# Patient Record
Sex: FEMALE | URBAN - METROPOLITAN AREA
[De-identification: names, ages, dates, MRNs, and addresses within clinical notes are randomized per-mention and may not be internally consistent; named-entity substitution may affect disease eponyms.]

---

## 2019-01-11 ENCOUNTER — APPOINTMENT (RX ONLY)
Dept: URBAN - METROPOLITAN AREA CLINIC 56 | Facility: CLINIC | Age: 19
Setting detail: DERMATOLOGY
End: 2019-01-11

## 2019-01-11 DIAGNOSIS — L83 ACANTHOSIS NIGRICANS: ICD-10-CM

## 2019-01-11 PROBLEM — L70.0 ACNE VULGARIS: Status: ACTIVE | Noted: 2019-01-11

## 2019-01-11 PROBLEM — L30.9 DERMATITIS, UNSPECIFIED: Status: ACTIVE | Noted: 2019-01-11

## 2019-01-11 PROCEDURE — 11105 PUNCH BX SKIN EA SEP/ADDL: CPT

## 2019-01-11 PROCEDURE — ? BIOPSY BY PUNCH METHOD

## 2019-01-11 PROCEDURE — 99202 OFFICE O/P NEW SF 15 MIN: CPT | Mod: 25

## 2019-01-11 PROCEDURE — 11104 PUNCH BX SKIN SINGLE LESION: CPT

## 2019-01-11 PROCEDURE — ? COUNSELING

## 2019-01-11 PROCEDURE — ? ORDER TESTS

## 2019-01-11 ASSESSMENT — LOCATION SIMPLE DESCRIPTION DERM
LOCATION SIMPLE: LEFT BREAST
LOCATION SIMPLE: RIGHT UPPER BACK
LOCATION SIMPLE: UPPER BACK
LOCATION SIMPLE: RIGHT SHOULDER
LOCATION SIMPLE: RIGHT ANTERIOR NECK
LOCATION SIMPLE: CHEST

## 2019-01-11 ASSESSMENT — LOCATION DETAILED DESCRIPTION DERM
LOCATION DETAILED: RIGHT INFERIOR ANTERIOR NECK
LOCATION DETAILED: RIGHT ANTERIOR SHOULDER
LOCATION DETAILED: RIGHT SUPERIOR MEDIAL UPPER BACK
LOCATION DETAILED: LEFT AXILLARY TAIL OF BREAST
LOCATION DETAILED: SUPERIOR THORACIC SPINE
LOCATION DETAILED: UPPER STERNUM

## 2019-01-11 ASSESSMENT — LOCATION ZONE DERM
LOCATION ZONE: ARM
LOCATION ZONE: TRUNK
LOCATION ZONE: NECK

## 2019-01-11 NOTE — PROCEDURE: BIOPSY BY PUNCH METHOD
Size Of Lesion In Cm (Optional): 0
Path Notes (To The Dermatopathologist): R/o EAS vs. EMM vs. other. Dr. CHAVEZ to read.
Suture Removal: 7 days
Detail Level: Detailed
Punch Size In Mm: 4
Post-Care Instructions: I reviewed with the patient in detail post-care instructions. Patient is to keep the biopsy site dry overnight, and then apply bacitracin twice daily until healed. Patient may apply hydrogen peroxide soaks to remove any crusting.
Anesthesia Volume In Cc (Will Not Render If 0): 0.5
Wound Care: Petrolatum
Bill For Surgical Tray: no
Epidermal Sutures: 4-0 Ethilon
Home Suture Removal Text: Patient was provided a home suture removal kit and will remove their sutures at home.  If they have any questions or difficulties they will call the office.
Lab: 6
Biopsy Type: H and E
Hemostasis: None
Notification Instructions: Patient will be notified of biopsy results. However, patient instructed to call the office if not contacted within 2 weeks.
Was A Bandage Applied: Yes
Consent: Written consent was obtained and risks were reviewed including but not limited to scarring, infection, bleeding, scabbing, incomplete removal, nerve damage and allergy to anesthesia.
Dressing: bandage
Lab Facility: [id number]
Anesthesia Type: 1% lidocaine with epinephrine
Billing Type: Third-Party Bill
Path Notes (To The Dermatopathologist): R/o EAS vs. EMM vs. other. Dr. CHAVEZ to read.

## 2019-01-11 NOTE — PROCEDURE: ORDER TESTS
Performing Laboratory: -40
Billing Type: Third-Party Bill
Expected Date Of Service: 01/11/2019
Bill For Surgical Tray: no

## 2019-01-11 NOTE — PROCEDURE: COUNSELING
Patient Specific Counseling (Will Not Stick From Patient To Patient): Patient was advised to use Am-Lactin lotion every day to chest, neck and back.
Detail Level: Detailed

## 2019-01-25 ENCOUNTER — APPOINTMENT (RX ONLY)
Dept: URBAN - METROPOLITAN AREA CLINIC 56 | Facility: CLINIC | Age: 19
Setting detail: DERMATOLOGY
End: 2019-01-25

## 2019-01-25 DIAGNOSIS — Z48.02 ENCOUNTER FOR REMOVAL OF SUTURES: ICD-10-CM

## 2019-01-25 DIAGNOSIS — L83 ACANTHOSIS NIGRICANS: ICD-10-CM

## 2019-01-25 PROCEDURE — 99212 OFFICE O/P EST SF 10 MIN: CPT

## 2019-01-25 PROCEDURE — ? COUNSELING

## 2019-01-25 PROCEDURE — 99024 POSTOP FOLLOW-UP VISIT: CPT

## 2019-01-25 PROCEDURE — ? SUTURE REMOVAL (GLOBAL PERIOD)

## 2019-01-25 PROCEDURE — ? PRESCRIPTION

## 2019-01-25 RX ORDER — MINOCYCLINE HYDROCHLORIDE 100 MG/1
CAPSULE ORAL QD
Qty: 28 | Refills: 3 | Status: ERX | COMMUNITY
Start: 2019-01-25

## 2019-01-25 RX ADMIN — MINOCYCLINE HYDROCHLORIDE: 100 CAPSULE ORAL at 00:00

## 2019-01-25 ASSESSMENT — LOCATION DETAILED DESCRIPTION DERM
LOCATION DETAILED: SUPERIOR THORACIC SPINE
LOCATION DETAILED: RIGHT SUPERIOR MEDIAL UPPER BACK

## 2019-01-25 ASSESSMENT — LOCATION SIMPLE DESCRIPTION DERM
LOCATION SIMPLE: RIGHT UPPER BACK
LOCATION SIMPLE: UPPER BACK

## 2019-01-25 ASSESSMENT — LOCATION ZONE DERM: LOCATION ZONE: TRUNK

## 2019-01-25 NOTE — PROCEDURE: SUTURE REMOVAL (GLOBAL PERIOD)
Detail Level: Detailed
Add 84826 Cpt? (Important Note: In 2017 The Use Of 94260 Is Being Tracked By Cms To Determine Future Global Period Reimbursement For Global Periods): yes

## 2019-02-01 ENCOUNTER — APPOINTMENT (RX ONLY)
Dept: URBAN - METROPOLITAN AREA CLINIC 56 | Facility: CLINIC | Age: 19
Setting detail: DERMATOLOGY
End: 2019-02-01

## 2019-02-01 DIAGNOSIS — Z48.817 ENCOUNTER FOR SURGICAL AFTERCARE FOLLOWING SURGERY ON THE SKIN AND SUBCUTANEOUS TISSUE: ICD-10-CM

## 2019-02-01 PROCEDURE — ? POST-OP WOUND CHECK

## 2019-02-01 PROCEDURE — 99024 POSTOP FOLLOW-UP VISIT: CPT

## 2019-02-01 ASSESSMENT — LOCATION SIMPLE DESCRIPTION DERM: LOCATION SIMPLE: POSTERIOR NECK

## 2019-02-01 ASSESSMENT — LOCATION ZONE DERM: LOCATION ZONE: NECK

## 2019-02-01 ASSESSMENT — LOCATION DETAILED DESCRIPTION DERM: LOCATION DETAILED: RIGHT MEDIAL TRAPEZIAL NECK

## 2019-02-01 NOTE — PROCEDURE: POST-OP WOUND CHECK
Add 91685 Cpt? (Important Note: In 2017 The Use Of 40147 Is Being Tracked By Cms To Determine Future Global Period Reimbursement For Global Periods): yes
Wound Evaluated By: Rachel Washburn PA-C
Detail Level: Detailed

## 2019-02-22 ENCOUNTER — APPOINTMENT (RX ONLY)
Dept: URBAN - METROPOLITAN AREA CLINIC 56 | Facility: CLINIC | Age: 19
Setting detail: DERMATOLOGY
End: 2019-02-22

## 2019-02-22 DIAGNOSIS — L83 ACANTHOSIS NIGRICANS: ICD-10-CM

## 2019-02-22 DIAGNOSIS — Z48.817 ENCOUNTER FOR SURGICAL AFTERCARE FOLLOWING SURGERY ON THE SKIN AND SUBCUTANEOUS TISSUE: ICD-10-CM

## 2019-02-22 PROCEDURE — ? POST-OP WOUND CHECK

## 2019-02-22 PROCEDURE — ? COUNSELING

## 2019-02-22 PROCEDURE — 99212 OFFICE O/P EST SF 10 MIN: CPT

## 2019-02-22 PROCEDURE — 99024 POSTOP FOLLOW-UP VISIT: CPT

## 2019-02-22 PROCEDURE — ? INVENTORY

## 2019-02-22 ASSESSMENT — LOCATION SIMPLE DESCRIPTION DERM: LOCATION SIMPLE: RIGHT UPPER BACK

## 2019-02-22 ASSESSMENT — LOCATION ZONE DERM: LOCATION ZONE: TRUNK

## 2019-02-22 ASSESSMENT — LOCATION DETAILED DESCRIPTION DERM: LOCATION DETAILED: RIGHT SUPERIOR MEDIAL UPPER BACK

## 2019-02-22 NOTE — PROCEDURE: COUNSELING
Patient Specific Counseling (Will Not Stick From Patient To Patient): Patient will decrease minocycline to QOD.
Detail Level: Detailed

## 2019-02-22 NOTE — PROCEDURE: POST-OP WOUND CHECK
Detail Level: Detailed
Wound Evaluated By: Rachel Washburn PA-C
Add 00746 Cpt? (Important Note: In 2017 The Use Of 77503 Is Being Tracked By Cms To Determine Future Global Period Reimbursement For Global Periods): yes

## 2019-06-14 ENCOUNTER — APPOINTMENT (RX ONLY)
Dept: URBAN - METROPOLITAN AREA CLINIC 56 | Facility: CLINIC | Age: 19
Setting detail: DERMATOLOGY
End: 2019-06-14

## 2019-06-14 DIAGNOSIS — L83 ACANTHOSIS NIGRICANS: ICD-10-CM | Status: IMPROVED

## 2019-06-14 PROCEDURE — ? COUNSELING

## 2019-06-14 PROCEDURE — 99212 OFFICE O/P EST SF 10 MIN: CPT

## 2019-06-14 PROCEDURE — ? ADDITIONAL NOTES

## 2019-06-14 PROCEDURE — ? PRESCRIPTION

## 2019-06-14 RX ORDER — MINOCYCLINE HYDROCHLORIDE 100 MG/1
CAPSULE ORAL QD
Qty: 28 | Refills: 3 | Status: ERX

## 2019-06-14 ASSESSMENT — LOCATION SIMPLE DESCRIPTION DERM
LOCATION SIMPLE: LEFT UPPER BACK
LOCATION SIMPLE: RIGHT UPPER BACK

## 2019-06-14 ASSESSMENT — LOCATION ZONE DERM: LOCATION ZONE: TRUNK

## 2019-06-14 ASSESSMENT — LOCATION DETAILED DESCRIPTION DERM
LOCATION DETAILED: LEFT SUPERIOR MEDIAL UPPER BACK
LOCATION DETAILED: RIGHT SUPERIOR UPPER BACK

## 2019-06-14 NOTE — PROCEDURE: ADDITIONAL NOTES
Additional Notes: Patient recently discontinued her minocycline and noted her rash started to recur. She is now back on the medication and doing well. Has been back on the minocycline since 5/20. Will have her continue QD until 6/20 and then decrease to QOD. Patient will remain on the QOD dosing if this keeps her clear but was instructed to increase to QD if she finds she does better at this dose. Discussed risk of rare liver abnormality, DRESS, and other side effects of minocycline. Consider bloodwork at follow up if the patient requires daily minocycline.
Detail Level: Zone